# Patient Record
Sex: MALE | HISPANIC OR LATINO | Employment: FULL TIME | URBAN - METROPOLITAN AREA
[De-identification: names, ages, dates, MRNs, and addresses within clinical notes are randomized per-mention and may not be internally consistent; named-entity substitution may affect disease eponyms.]

---

## 2020-08-24 ENCOUNTER — TREATMENT (OUTPATIENT)
Dept: PHYSICAL THERAPY | Facility: CLINIC | Age: 35
End: 2020-08-24

## 2020-08-24 DIAGNOSIS — Z98.890 STATUS POST OPEN REDUCTION AND INTERNAL FIXATION (ORIF) OF FRACTURE: Primary | ICD-10-CM

## 2020-08-24 DIAGNOSIS — Z87.81 STATUS POST OPEN REDUCTION AND INTERNAL FIXATION (ORIF) OF FRACTURE: Primary | ICD-10-CM

## 2020-08-24 DIAGNOSIS — R26.2 DIFFICULTY WALKING: ICD-10-CM

## 2020-08-24 DIAGNOSIS — M25.572 ACUTE LEFT ANKLE PAIN: ICD-10-CM

## 2020-08-24 PROCEDURE — 97110 THERAPEUTIC EXERCISES: CPT | Performed by: PHYSICAL THERAPIST

## 2020-08-24 PROCEDURE — 97161 PT EVAL LOW COMPLEX 20 MIN: CPT | Performed by: PHYSICAL THERAPIST

## 2020-08-24 PROCEDURE — 97530 THERAPEUTIC ACTIVITIES: CPT | Performed by: PHYSICAL THERAPIST

## 2020-08-24 NOTE — PROGRESS NOTES
Physical Therapy Initial Evaluation and Plan of Care      Patient: Sergio Arambula   : 1985  Diagnosis/ICD-10 Code:  Status post open reduction and internal fixation (ORIF) of fracture [Z98.890, Z87.81]  Referring practitioner: Pawan Nava MD  Date of Initial Visit: 2020  Today's Date: 2020  Patient seen for 1 sessions           Subjective questionnaire: [facility]  = 20%    Subjective Evaluation    History of Present Illness  Date of onset: 6/10/2020  Date of surgery: 2020  Mechanism of injury: (Subjective history obtained with assistance of )    I was sitting between a tractor and a trailer at work when the tire ran over my left ankle.  Denies previous injury to (L) ankle.      Patient Occupation:    Precautions and Work Restrictions: Has not returned to workQuality of life: good    Pain  Current pain ratin (does not have pain at rest)  At best pain ratin  At worst pain rating: 3  Location: left ankle  Quality: dull ache  Relieving factors: ice  Aggravating factors: standing, stairs and ambulation (putting any weight on his ankle at all)  Progression: improved    Social Support  Lives in: one-story house (3 stairs to get into house; no handrails)  Lives with: spouse    Patient Goals  Patient goals for therapy: return to work  Patient goal: Be able to walk without fear of re-injury           Objective          Observations   Left Ankle/Foot   Positive for incision.     Additional Ankle/Foot Observation Details  Incision appears to be healing well, with no signs of infection    Active Range of Motion   Left Ankle/Foot   Dorsiflexion (ke): 15 degrees with pain  Plantar flexion: 45 degrees   Inversion: 20 degrees   Eversion: 23 degrees with pain    Right Ankle/Foot   Dorsiflexion (ke): 20 degrees   Plantar flexion: 48 degrees   Inversion: 22 degrees   Eversion: 34 degrees     Additional Active Range of Motion Details  DF pain on anterior superior  ankle  Eversion pain on posterior lateral ankle    Passive Range of Motion   Left Ankle/Foot    Dorsiflexion (ke): 15 degrees   Plantar flexion: 41 degrees   Inversion: 15 degrees   Eversion: 25 degrees     Strength/Myotome Testing     Left Ankle/Foot   Dorsiflexion: 4+    Right Ankle/Foot   Dorsiflexion: 5    Swelling   Left Ankle/Foot   Metatarsal heads girth: 24.7 cm.  Malleoli girth: 27.0 cm.    Right Ankle/Foot   Metatarsal heads girth: 23.7 cm.  Malleoli girth: 26.7 cm.    Ambulation     Observational Gait   Decreased left stance time and right swing time.   Base of support: increased  Base of support comments: uses crutches          Assessment & Plan     Assessment  Impairments: abnormal gait, abnormal or restricted ROM, impaired balance, impaired physical strength, lacks appropriate home exercise program and weight-bearing intolerance  Assessment details: Patient is a 34 y.o male who presents to therapy s/p ORIF of (L) distal fibula fracture on 06/22/2020. He reports symptoms 0-3/10; he does not experience symptoms at rest, but putting any weight at all on his (L) leg causes pain. He exhibits AROM and strength deficits in his (L) ankle, with most pain during dorsiflexion. Patient's goals are to be able to return to work without the fear of re-injuring his ankle. He will benefit from physical therapy to increase his ROM and strength to return to full work duties without pain.  Prognosis: good  Functional Limitations: walking and standing  Goals  Plan Goals: STGs (to be completed in 4 weeks):  1. Patient will be independent with initial HEP.  2. Patient's (L) ankle dorsiflexion AROM will be at least 25 degrees for improved joint mobility.  3. Patient's pain will decrease to 0-1/10 for improved tolerance to weight bearing positions.  4. Patient will be able to weight bear at least 50% on his (L) leg when walking without the use of his crutches for improved gait pattern.    LTGs (to be completed in 8 weeks):  1.  Patient will be independent with progressed strengthening HEP.  2. Patient's (L) ankle strength will be at least 5/5 all planes for improved functional use of his (L) leg in weight bearing positions.  3. Patient's (L) ankle AROM will be WFL all planes for improved joint mobility.  4. Patient will be able to navigate stairs using a reciprocal pattern without an increase in symptoms.  5. Patient will successfully RTW without restrictions    Plan  Therapy options: will be seen for skilled physical therapy services  Planned modality interventions: cryotherapy, electrical stimulation/Russian stimulation, thermotherapy (hydrocollator packs) and ultrasound  Planned therapy interventions: balance/weight-bearing training, functional ROM exercises, home exercise program, gait training, joint mobilization, manual therapy, neuromuscular re-education, soft tissue mobilization, strengthening, stretching and therapeutic activities  Duration in visits: 12  Treatment plan discussed with: patient        Manual Therapy:         mins  56545;  Therapeutic Exercise:    13     mins  40213;     Neuromuscular Lakhwinder:        mins  30695;    Therapeutic Activity:     11     mins  27528;     Gait Training:           mins  04972;     Ultrasound:          mins  11921;    Electrical Stimulation:         mins  63950 ( );  Dry Needling          mins self-pay    Timed Treatment:   24   mins   Total Treatment:     51   mins    PT SIGNATURE: Chrystal Glover PT   DATE TREATMENT INITIATED: 8/24/2020    Initial Certification  Certification Period: 11/22/2020  I certify that the therapy services are furnished while this patient is under my care.  The services outlined above are required by this patient, and will be reviewed every 90 days.     PHYSICIAN: Pawan Nava MD      DATE:     Please sign and return via fax to [phone].. Thank you, [facility].

## 2020-08-31 ENCOUNTER — TREATMENT (OUTPATIENT)
Dept: PHYSICAL THERAPY | Facility: CLINIC | Age: 35
End: 2020-08-31

## 2020-08-31 DIAGNOSIS — R26.2 DIFFICULTY WALKING: ICD-10-CM

## 2020-08-31 DIAGNOSIS — Z87.81 STATUS POST OPEN REDUCTION AND INTERNAL FIXATION (ORIF) OF FRACTURE: Primary | ICD-10-CM

## 2020-08-31 DIAGNOSIS — Z98.890 STATUS POST OPEN REDUCTION AND INTERNAL FIXATION (ORIF) OF FRACTURE: Primary | ICD-10-CM

## 2020-08-31 DIAGNOSIS — M25.572 ACUTE LEFT ANKLE PAIN: ICD-10-CM

## 2020-08-31 PROCEDURE — 97110 THERAPEUTIC EXERCISES: CPT | Performed by: PHYSICAL THERAPIST

## 2020-08-31 PROCEDURE — 97140 MANUAL THERAPY 1/> REGIONS: CPT | Performed by: PHYSICAL THERAPIST

## 2020-08-31 NOTE — PROGRESS NOTES
Physical Therapy Daily Progress Note    Visit # 2    [facility] reports: [through interpeter] Ankle is feeling good; no pain right now.     Subjective     Objective   See Exercise, Manual, and Modality Logs for complete treatment.   *Initiated toe curls with towel, towel windshield wipers, and seated heel raises  *Initiated manual techniques    Assessment & Plan     Assessment  Assessment details: Patient demonstrates good therapeutic exercise recall, substantiating reports of HEP compliance.  He is able to tolerate exercise progression for basic ROM activities as well as manual techniques including talocrural joint mobilizations and scar massage.  He is instructed in performance of self scar massage at home daily.  He verbalizes understanding.  Updated HEP printout is issued to facilitate compliance and recall.        Progress per Plan of Care         Manual Therapy:    12     mins  03358;  Therapeutic Exercise:    29     mins  98662;     Neuromuscular Lakhwinder:        mins  58482;    Therapeutic Activity:          mins  90853;     Gait Training:           mins  98985;     Ultrasound:          mins  06655;    Electrical Stimulation:         mins  49838 ( );  Dry Needling          mins self-pay    Timed Treatment:   41   mins   Total Treatment:     41   mins    Chrystal Glover PT, DPT  Physical Therapist  [place] License # [id number]

## 2020-09-02 ENCOUNTER — TREATMENT (OUTPATIENT)
Dept: PHYSICAL THERAPY | Facility: CLINIC | Age: 35
End: 2020-09-02

## 2020-09-02 DIAGNOSIS — R26.2 DIFFICULTY WALKING: ICD-10-CM

## 2020-09-02 DIAGNOSIS — Z87.81 STATUS POST OPEN REDUCTION AND INTERNAL FIXATION (ORIF) OF FRACTURE: Primary | ICD-10-CM

## 2020-09-02 DIAGNOSIS — M25.572 ACUTE LEFT ANKLE PAIN: ICD-10-CM

## 2020-09-02 DIAGNOSIS — Z98.890 STATUS POST OPEN REDUCTION AND INTERNAL FIXATION (ORIF) OF FRACTURE: Primary | ICD-10-CM

## 2020-09-02 PROCEDURE — 97110 THERAPEUTIC EXERCISES: CPT | Performed by: PHYSICAL THERAPIST

## 2020-09-02 PROCEDURE — 97140 MANUAL THERAPY 1/> REGIONS: CPT | Performed by: PHYSICAL THERAPIST

## 2020-09-02 NOTE — PROGRESS NOTES
Physical Therapy Daily Progress Note    Visit # 3      [facility] reports: [Communication assisted through ] Ankle is feeling good.  No pain or stiffness today.  I have been continuing the exercises at home and it seems like my foot has been swelling a little more and the veins have been popping up/out. When I put ice on it and elevated it, the swelling went away.    Subjective     Objective   See Exercise, Manual, and Modality Logs for complete treatment.   *Initiated toe curls, toe spreads, and rockerboard    Assessment & Plan     Assessment  Assessment details: Patient is able to progress from use of (B) axillary crutches to single axillary crutch (R) with walking boot (L) LE.  Patient demonstrates step-through gait pattern without symptom provocation.  Encouraged gradual progression of ambulation with single crutch vs double but no major jumps in activity due to possibility of ankle reacting with increased swelling and/or pain.  ROM is limited in planes of DF > eversion > inversion > PF.        Progress strengthening /stabilization /functional activity         Manual Therapy:    13     mins  74530;  Therapeutic Exercise:    21     mins  34392;     Neuromuscular Lakhwinder:        mins  25483;    Therapeutic Activity:          mins  21529;     Gait Training:           mins  92854;     Ultrasound:          mins  52005;    Electrical Stimulation:         mins  15546 ( );  Dry Needling          mins self-pay    Timed Treatment:   34   mins   Total Treatment:     34   mins    Chrystal Glover PT, DPT  Physical Therapist  [place] License # [id number]

## 2020-09-09 ENCOUNTER — TREATMENT (OUTPATIENT)
Dept: PHYSICAL THERAPY | Facility: CLINIC | Age: 35
End: 2020-09-09

## 2020-09-09 DIAGNOSIS — R26.2 DIFFICULTY WALKING: ICD-10-CM

## 2020-09-09 DIAGNOSIS — Z98.890 STATUS POST OPEN REDUCTION AND INTERNAL FIXATION (ORIF) OF FRACTURE: Primary | ICD-10-CM

## 2020-09-09 DIAGNOSIS — M25.572 ACUTE LEFT ANKLE PAIN: ICD-10-CM

## 2020-09-09 DIAGNOSIS — Z87.81 STATUS POST OPEN REDUCTION AND INTERNAL FIXATION (ORIF) OF FRACTURE: Primary | ICD-10-CM

## 2020-09-09 PROCEDURE — 97140 MANUAL THERAPY 1/> REGIONS: CPT | Performed by: PHYSICAL THERAPIST

## 2020-09-09 PROCEDURE — 97110 THERAPEUTIC EXERCISES: CPT | Performed by: PHYSICAL THERAPIST

## 2020-09-09 NOTE — PROGRESS NOTES
Physical Therapy Daily Progress Note    Visit # 4    [facility] reports: [Communication assisted through ] Patient went to see the surgeon today.  They took xrays which looked good.  He said the pressure and pain patient is having with weightbearing is normal and will subside as he puts more weight on the foot.  The ankle feels good today. Patient feels like it is moving better.    Subjective     Objective   See Exercise, Manual, and Modality Logs for complete treatment.   *Increased reps of exercises where appropriate  *Initiated weightshifting and gait step-throughs without boot    Assessment & Plan     Assessment  Assessment details: Patient demonstrates improving (L) ankle mobility all planes, notably into dorsiflexion this date as he is able to touch the back edge of the rockerboard to the ground this date. He is able to initiate weightbearing activities of the (L) ankle/foot complex without support of the boot this date, but patient expresses apprehension regarding putting full weight through the (L) LE again for the first time since injury.  He is encouraged to bring a tennis shoe with him next visit for his (L) foot in order to progress weightbearing activities to successfully transition out of boot.  He performs weightbearing activities painfree but through limited ROM arcs.         Progress strengthening /stabilization /functional activity         Manual Therapy:    13     mins  17708;  Therapeutic Exercise:    35     mins  47850;     Neuromuscular Lakhwinder:        mins  17382;    Therapeutic Activity:          mins  51284;     Gait Training:           mins  70072;     Ultrasound:          mins  22275;    Electrical Stimulation:         mins  11406 ( );  Dry Needling          mins self-pay    Timed Treatment:   48   mins   Total Treatment:     48   mins    Chrystal Glover PT, DPT  Physical Therapist  [place] License # [id number]

## 2020-09-11 ENCOUNTER — TREATMENT (OUTPATIENT)
Dept: PHYSICAL THERAPY | Facility: CLINIC | Age: 35
End: 2020-09-11

## 2020-09-11 DIAGNOSIS — R26.2 DIFFICULTY WALKING: ICD-10-CM

## 2020-09-11 DIAGNOSIS — Z87.81 STATUS POST OPEN REDUCTION AND INTERNAL FIXATION (ORIF) OF FRACTURE: Primary | ICD-10-CM

## 2020-09-11 DIAGNOSIS — Z98.890 STATUS POST OPEN REDUCTION AND INTERNAL FIXATION (ORIF) OF FRACTURE: Primary | ICD-10-CM

## 2020-09-11 DIAGNOSIS — M25.572 ACUTE LEFT ANKLE PAIN: ICD-10-CM

## 2020-09-11 PROCEDURE — 97110 THERAPEUTIC EXERCISES: CPT | Performed by: PHYSICAL THERAPIST

## 2020-09-11 PROCEDURE — 97116 GAIT TRAINING THERAPY: CPT | Performed by: PHYSICAL THERAPIST

## 2020-09-11 PROCEDURE — 97140 MANUAL THERAPY 1/> REGIONS: CPT | Performed by: PHYSICAL THERAPIST

## 2020-09-11 NOTE — PROGRESS NOTES
Physical Therapy Daily Progress Note    Visit # 5    [facility] reports: Ankle is feeling good.     Subjective     Objective   See Exercise, Manual, and Modality Logs for complete treatment.   *Initiated resisted ankle 4-way and standing heel raises  *Initiated gait training with tennis shoe and no A.D.    Assessment & Plan     Assessment  Assessment details: Patient is able to progress weightbearing activities this date wearing regular tennis shoe and without assistive device.  Standing heel raises are performed through 50-75% AROM arc with mild distal Achilles discomfort. He is able to initiate step through gait with slowed thierry but utilizing good heel to toe gait pattern.  He is instructed in gradual increase in weightbearing to avoid sudden increases, and to monitor for responsive soreness or swelling with utilization of ice as needed to control.        Progress strengthening /stabilization /functional activity         Manual Therapy:    14     mins  70091;  Therapeutic Exercise:    20     mins  47887;     Neuromuscular Lakhwinder:        mins  42217;    Therapeutic Activity:          mins  32650;     Gait Trainin     mins  63110;     Ultrasound:          mins  27408;    Electrical Stimulation:         mins  51299 ( );  Dry Needling          mins self-pay    Timed Treatment:   48   mins   Total Treatment:     48   mins    Chrystal Glover PT, DPT  Physical Therapist  [place] License # [id number]

## 2020-09-14 ENCOUNTER — TREATMENT (OUTPATIENT)
Dept: PHYSICAL THERAPY | Facility: CLINIC | Age: 35
End: 2020-09-14

## 2020-09-14 DIAGNOSIS — M25.572 ACUTE LEFT ANKLE PAIN: ICD-10-CM

## 2020-09-14 DIAGNOSIS — Z87.81 STATUS POST OPEN REDUCTION AND INTERNAL FIXATION (ORIF) OF FRACTURE: Primary | ICD-10-CM

## 2020-09-14 DIAGNOSIS — R26.2 DIFFICULTY WALKING: ICD-10-CM

## 2020-09-14 DIAGNOSIS — Z98.890 STATUS POST OPEN REDUCTION AND INTERNAL FIXATION (ORIF) OF FRACTURE: Primary | ICD-10-CM

## 2020-09-14 PROCEDURE — 97140 MANUAL THERAPY 1/> REGIONS: CPT | Performed by: PHYSICAL THERAPIST

## 2020-09-14 PROCEDURE — 97110 THERAPEUTIC EXERCISES: CPT | Performed by: PHYSICAL THERAPIST

## 2020-09-14 NOTE — PROGRESS NOTES
Physical Therapy Daily Progress Note    Visit # 6    [facility] reports: [Communication assisted via ] Patient reports he has been walking around his home wearing a tennis shoe on his (L) foot and not using his crutch.  It has been feeling pretty good.  It gets a little sore on the [medial aspect] of his (L) ankle and is swelling a little but he has been placing ice on it. He spent the majority of his day at home yesterday wearing his shoe instead of the boot and not using the crutch.    Subjective     Objective   See Exercise, Manual, and Modality Logs for complete treatment.       Assessment & Plan     Assessment  Assessment details: Patient's quality of gait has improved this date compared to last visit.  He has improved symmetry of weightshift and stance time without boot this date, though limited DF ROM of his (L) ankle prevents normal (R) step length.  He also lacks sufficient push-off during terminal stance phase (L).  He exhibits balance deficits during single limb stance positions while ambulating in regular tennis shoe but is able to self-recover.        Other - reassess (L) ankle ROM.         Manual Therapy:    16     mins  30481;  Therapeutic Exercise:    34     mins  05979;     Neuromuscular Lakhwinder:        mins  42940;    Therapeutic Activity:          mins  14539;     Gait Training:           mins  49377;     Ultrasound:          mins  94983;    Electrical Stimulation:         mins  43900 ( );  Dry Needling          mins self-pay    Timed Treatment:   50   mins   Total Treatment:     50   mins    Chrystal Glover PT, DPT  Physical Therapist  [place] License # [id number]

## 2020-09-16 ENCOUNTER — TREATMENT (OUTPATIENT)
Dept: PHYSICAL THERAPY | Facility: CLINIC | Age: 35
End: 2020-09-16

## 2020-09-16 DIAGNOSIS — Z98.890 STATUS POST OPEN REDUCTION AND INTERNAL FIXATION (ORIF) OF FRACTURE: Primary | ICD-10-CM

## 2020-09-16 DIAGNOSIS — R26.2 DIFFICULTY WALKING: ICD-10-CM

## 2020-09-16 DIAGNOSIS — Z87.81 STATUS POST OPEN REDUCTION AND INTERNAL FIXATION (ORIF) OF FRACTURE: Primary | ICD-10-CM

## 2020-09-16 DIAGNOSIS — M25.572 ACUTE LEFT ANKLE PAIN: ICD-10-CM

## 2020-09-16 PROCEDURE — 97110 THERAPEUTIC EXERCISES: CPT | Performed by: PHYSICAL THERAPIST

## 2020-09-16 NOTE — PROGRESS NOTES
"Physical Therapy Daily Progress Note    Visit # 7    [facility] reports: [Communication assisted by use of ] My ankle is feeling pretty good.  I tried doing that new exercise at home but couldn't do it very well.  My ankle and foot swelled a little bit on the inside part, but I have been putting ice on and by the next morning it goes back to normal.    Subjective     Objective   See Exercise, Manual, and Modality Logs for complete treatment.   *Initiated FW and LAT step-ups and march walking    Assessment & Plan     Assessment  Assessment details: Patient demonstrates improving quality of gait including increased push-off during terminal stance phase (L) LE. He is able to initiate FW and LAT step-ups at 4\" step height painfree.  He is cued during gait step-throughs and march walking to decrease (R) step length to maintain (L) ankle/foot painfree.  He has not progressed to consistent ambulation without assistive device and only uses walking boot for prolonged walking in the community such as grocery shopping.        Progress strengthening /stabilization /functional activity         Manual Therapy:         mins  47847;  Therapeutic Exercise:    50     mins  96109;     Neuromuscular Lakhwinder:        mins  95232;    Therapeutic Activity:          mins  36292;     Gait Training:           mins  84397;     Ultrasound:          mins  67531;    Electrical Stimulation:         mins  16551 ( );  Dry Needling          mins self-pay    Timed Treatment:   50   mins   Total Treatment:     50   mins    Chrystal Glover PT, DPT  Physical Therapist  [place] License # [id number]    "

## 2020-09-23 ENCOUNTER — TREATMENT (OUTPATIENT)
Dept: PHYSICAL THERAPY | Facility: CLINIC | Age: 35
End: 2020-09-23

## 2020-09-23 DIAGNOSIS — Z98.890 STATUS POST OPEN REDUCTION AND INTERNAL FIXATION (ORIF) OF FRACTURE: Primary | ICD-10-CM

## 2020-09-23 DIAGNOSIS — M25.572 ACUTE LEFT ANKLE PAIN: ICD-10-CM

## 2020-09-23 DIAGNOSIS — R26.2 DIFFICULTY WALKING: ICD-10-CM

## 2020-09-23 DIAGNOSIS — Z87.81 STATUS POST OPEN REDUCTION AND INTERNAL FIXATION (ORIF) OF FRACTURE: Primary | ICD-10-CM

## 2020-09-23 PROCEDURE — 97110 THERAPEUTIC EXERCISES: CPT | Performed by: PHYSICAL THERAPIST

## 2020-09-23 NOTE — PROGRESS NOTES
Physical Therapy Daily Progress Note    Visit # 8    [facility] reports: [Communication assisted by ] My ankle feels pretty good and I am walking better, but if I walk too long my ankle starts to swell. If I am up on my feet about an hour, walking around at home and sometimes outside, it starts to swell.  When I wake up in the mornings though it isn't swollen.    Subjective     Objective   See Exercise, Manual, and Modality Logs for complete treatment.   *Initiated sidestepping    Assessment & Plan     Assessment  Assessment details: Patient presents with notable swelling surrounding (L) talocrural joint which responds moderately to retrograde edema massage and joint mobilizations.  Patient is encouraged to increase frequency of ice application, especially following prolonged periods of weightbearing. His quality of ambulation is steadily improving, however, including greater push-off and heel toe gait pattern as well as more symmetric weightshift and stance time.        Progress strengthening /stabilization /functional activity         Manual Therapy:         mins  40771;  Therapeutic Exercise:    48     mins  80312;     Neuromuscular Lakhwinder:        mins  48678;    Therapeutic Activity:          mins  03033;     Gait Training:           mins  34527;     Ultrasound:          mins  83738;    Electrical Stimulation:         mins  93796 ( );  Dry Needling          mins self-pay    Timed Treatment:   48  Total Treatment:     52   mins    Chrystal Glover PT, DPT  Physical Therapist  [place] License # [id number]

## 2020-09-28 ENCOUNTER — TREATMENT (OUTPATIENT)
Dept: PHYSICAL THERAPY | Facility: CLINIC | Age: 35
End: 2020-09-28

## 2020-09-28 DIAGNOSIS — M25.572 ACUTE LEFT ANKLE PAIN: ICD-10-CM

## 2020-09-28 DIAGNOSIS — Z98.890 STATUS POST OPEN REDUCTION AND INTERNAL FIXATION (ORIF) OF FRACTURE: Primary | ICD-10-CM

## 2020-09-28 DIAGNOSIS — R26.2 DIFFICULTY WALKING: ICD-10-CM

## 2020-09-28 DIAGNOSIS — Z87.81 STATUS POST OPEN REDUCTION AND INTERNAL FIXATION (ORIF) OF FRACTURE: Primary | ICD-10-CM

## 2020-09-28 PROCEDURE — 97110 THERAPEUTIC EXERCISES: CPT | Performed by: PHYSICAL THERAPIST

## 2020-09-28 NOTE — PROGRESS NOTES
Physical Therapy Daily Progress Note      Visit # 9      Subjective Evaluation    History of Present Illness    Subjective comment: Pt reports that he currently has pain in the top of his (L) foot when stepping.       Objective   See Exercise, Manual, and Modality Logs for complete treatment.       Assessment & Plan     Assessment  Assessment details: Pt tolerated treatment with slight increase in pain in (L) foot and ankle.  Pt has been having pain in dorsum of his foot when walking.  Heel raises and step ups did not reproduce the pain. Pt continues to walk with an antalgic gait due to the pain.                     Manual Therapy:    0     mins  62182;  Therapeutic Exercise:    40     mins  20513;     Neuromuscular Lakhwinder:    0    mins  46819;    Therapeutic Activity:     0     mins  85967;     Gait Trainin     mins  35020;     Ultrasound:     0     mins  05092;    Work Hardening           0      mins 49758  Iontophoresis               0   mins 22564  E-Stim                          _0_ mins 66573 ( )    Timed Treatment:   40   mins   Total Treatment:     40   mins    Rufus Worthy PTA  Physical Therapist Assistant

## 2020-09-30 ENCOUNTER — TREATMENT (OUTPATIENT)
Dept: PHYSICAL THERAPY | Facility: CLINIC | Age: 35
End: 2020-09-30

## 2020-09-30 DIAGNOSIS — M25.572 ACUTE LEFT ANKLE PAIN: ICD-10-CM

## 2020-09-30 DIAGNOSIS — Z98.890 STATUS POST OPEN REDUCTION AND INTERNAL FIXATION (ORIF) OF FRACTURE: Primary | ICD-10-CM

## 2020-09-30 DIAGNOSIS — Z87.81 STATUS POST OPEN REDUCTION AND INTERNAL FIXATION (ORIF) OF FRACTURE: Primary | ICD-10-CM

## 2020-09-30 DIAGNOSIS — R26.2 DIFFICULTY WALKING: ICD-10-CM

## 2020-09-30 PROCEDURE — 97110 THERAPEUTIC EXERCISES: CPT | Performed by: PHYSICAL THERAPIST

## 2020-09-30 NOTE — PROGRESS NOTES
Re-Assessment / Re-Certification    Patient: Sergio Arambula   : 1985  Diagnosis/ICD-10 Code:  Status post open reduction and internal fixation (ORIF) of fracture [Z98.890, Z87.81]  Referring practitioner: Pawan Nava MD  Date of Initial Visit: 2020  Today's Date: 2020  Patient seen for 10 sessions      Subjective:   Sergio Arambula reports: [No  present for today's session] Patient communicates through question and answer: Ankle is feeling pretty good.  Still some pain on top of (L) foot when walking. He is able to negotiate 3 steps at home reciprocally both ascending and descending.  Subjective Questionnaire: LEFS: 58/80  Clinical Progress: improved  Home Program Compliance: Yes  Treatment has included: therapeutic exercise, neuromuscular re-education, manual therapy, therapeutic activity and gait training    Subjective   Objective          Tenderness     Additional Tenderness Details  Neg TTP (L) lateral ankle including distal fibula    Active Range of Motion   Left Ankle/Foot   Dorsiflexion (ke): 14 degrees   Plantar flexion: 55 degrees   Inversion: 32 degrees   Eversion: 28 degrees     Strength/Myotome Testing     Left Ankle/Foot   Dorsiflexion: 5  Plantar flexion: 5  Inversion: 5  Eversion: 4+    Swelling   Left Ankle/Foot   Metatarsal heads girth: 24.6.  Malleoli girth: 27.7.    Ambulation     Observational Gait   Gait: antalgic   Decreased left stance time and right step length.     Additional Observational Gait Details  No AD used during ambulation      Assessment & Plan     Assessment  Assessment details: Patient has been compliant, cooperative, and motivated with PT interventions.  He consistently reports no significant (L) ankle discomfort except mild pain dorsum of (L) foot during loading response and terminal stance phases of gait.  He consistently ambulates without AD with mild compensation and is able to negotiate steps reciprocally. His ankle ROM and strength are near  WNL. He has met or partially met most established PT goals. Pending MD assessment next week, patient may be appropriate and ready for a transition back to work activities. Anticipate 1 additional PT visit to finalized progressed HEP.    Goals  Plan Goals: STGs (to be completed in 2 weeks):  1. Patient will be independent with initial HEP. - MET  2. Patient's (L) ankle dorsiflexion AROM will be at least 20 degrees for improved joint mobility. - NOT MET  3. Patient's pain will decrease to 0-1/10 for improved tolerance to weight bearing positions. - PARTIALLY MET  4. Patient will be able to weight bear at least 50% on his (L) leg when walking without the use of his crutches for improved gait pattern. - MET    LTGs (to be completed in 4 weeks):  1. Patient will be independent with progressed strengthening HEP. - PARTIALLY MET  2. Patient's (L) ankle strength will be at least 5/5 all planes for improved functional use of his (L) leg in weight bearing positions. - PARTIALLY MET  3. Patient's (L) ankle AROM will be WFL all planes for improved joint mobility. - PARTIALLY MET  4. Patient will be able to navigate stairs using a reciprocal pattern without an increase in symptoms. - MET  5. Patient will successfully RTW without restrictions - NOT MET      Progress toward previous goals: Partially Met  Recommendations: Continue as planned  Timeframe: 1 month  Prognosis to achieve goals: good    PT Signature: Chrystal Glover PT  [place] License # [id number]      Based upon review of the patient's progress and continued therapy plan, it is my medical opinion that Sergio Arambula should continue physical therapy treatment at [facility]  [address] [place] [id number] [phone].    Signature: __________________________________  Pawan Nava MD    Manual Therapy:         mins  86416;  Therapeutic Exercise:    46     mins  76151;     Neuromuscular Lakhwinder:        mins  37167;     Therapeutic Activity:          mins  76007;     Gait Training:           mins  72424;     Ultrasound:          mins  85773;    Electrical Stimulation:         mins  09338 ( );  Dry Needling          mins self-pay    Timed Treatment:   46   mins   Total Treatment:     46   mins    Please sign and return via fax to [phone]. Thank you, [facility] Physical Therapy.

## 2020-10-02 ENCOUNTER — TREATMENT (OUTPATIENT)
Dept: PHYSICAL THERAPY | Facility: CLINIC | Age: 35
End: 2020-10-02

## 2020-10-02 DIAGNOSIS — Z98.890 STATUS POST OPEN REDUCTION AND INTERNAL FIXATION (ORIF) OF FRACTURE: Primary | ICD-10-CM

## 2020-10-02 DIAGNOSIS — R26.2 DIFFICULTY WALKING: ICD-10-CM

## 2020-10-02 DIAGNOSIS — Z87.81 STATUS POST OPEN REDUCTION AND INTERNAL FIXATION (ORIF) OF FRACTURE: Primary | ICD-10-CM

## 2020-10-02 PROCEDURE — 97110 THERAPEUTIC EXERCISES: CPT | Performed by: PHYSICAL THERAPIST

## 2020-11-10 ENCOUNTER — TELEPHONE (OUTPATIENT)
Dept: FAMILY MEDICINE CLINIC | Facility: CLINIC | Age: 35
End: 2020-11-10

## 2020-11-16 ENCOUNTER — OFFICE VISIT (OUTPATIENT)
Dept: FAMILY MEDICINE CLINIC | Facility: CLINIC | Age: 35
End: 2020-11-16

## 2020-11-16 VITALS
HEIGHT: 66 IN | TEMPERATURE: 98.2 F | OXYGEN SATURATION: 98 % | BODY MASS INDEX: 31.02 KG/M2 | RESPIRATION RATE: 18 BRPM | SYSTOLIC BLOOD PRESSURE: 120 MMHG | HEART RATE: 70 BPM | WEIGHT: 193 LBS | DIASTOLIC BLOOD PRESSURE: 70 MMHG

## 2020-11-16 DIAGNOSIS — N50.89 LUMP IN SCROTUM: Primary | ICD-10-CM

## 2020-11-16 PROCEDURE — 99203 OFFICE O/P NEW LOW 30 MIN: CPT | Performed by: FAMILY MEDICINE

## 2020-11-16 NOTE — PROGRESS NOTES
"SUBJECTIVE:  The patient is a 34-year-old male.  He is in this office for the first time.  He presents with a lump in the scrotum.  He had this checked 2 years ago was told he possibly had a hernia.  He states he noticed this again over the last 5 months.  He works as a .  No injury.  No fever chills or urinary symptoms.    Social history reviewed  Surgical history reviewed  History reviewed  Allergies-none known    PAST MEDICAL HISTORY:  Reviewed.    REVIEW OF SYSTEMS:  Please see above.  All others reviewed and are negative.      OBJECTIVE:   /70 (BP Location: Left arm, Patient Position: Sitting)   Pulse 70   Temp 98.2 °F (36.8 °C) (Infrared)   Resp 18   Ht 167.6 cm (66\")   Wt 87.5 kg (193 lb)   SpO2 98%   BMI 31.15 kg/m²    Vitals signs are reviewed and are stable.    General:  Well-nourished.  Alert and oriented x3 in no acute distress.  HEENT: PERRLA.   Neck:  Supple.   Lungs:  Clear.    Heart:  Regular rate and rhythm.   Abdomen:   Soft, nontender.   Genitalia: Normal male.  I cannot feel a testicular mass that is definite.  Nor can I feel a hernia.  Extremities:  No cyanosis, clubbing or edema.   Neurological:  Grossly intact without motor or sensory deficits.     ASSESSMENT:      Diagnoses and all orders for this visit:    1. Lump in scrotum (Primary)         PLAN: Using an  I explained that the first step would be an ultrasound.  Should this not be revealing I will get a general surgeon to evaluate this.  He may need a urological referral as well.  He will call or go to emergency room if any problems.    Dictated utilizing Dragon dictation.    "

## 2020-12-29 ENCOUNTER — DOCUMENTATION (OUTPATIENT)
Dept: PHYSICAL THERAPY | Facility: CLINIC | Age: 35
End: 2020-12-29

## 2023-02-03 ENCOUNTER — LAB (OUTPATIENT)
Dept: LAB | Facility: HOSPITAL | Age: 38
End: 2023-02-03
Payer: COMMERCIAL

## 2023-02-03 DIAGNOSIS — Z31.441 ENCOUNTER FOR TESTING OF MALE PARTNER OF FEMALE WITH RECURRENT PREGNANCY LOSS: ICD-10-CM

## 2023-02-03 DIAGNOSIS — Z31.441 ENCOUNTER FOR TESTING OF MALE PARTNER OF FEMALE WITH RECURRENT PREGNANCY LOSS: Primary | ICD-10-CM

## 2023-02-03 LAB — KELL: NEGATIVE

## 2023-02-03 PROCEDURE — 36415 COLL VENOUS BLD VENIPUNCTURE: CPT

## 2023-02-03 PROCEDURE — 86905 BLOOD TYPING RBC ANTIGENS: CPT
